# Patient Record
Sex: MALE | Race: WHITE
[De-identification: names, ages, dates, MRNs, and addresses within clinical notes are randomized per-mention and may not be internally consistent; named-entity substitution may affect disease eponyms.]

---

## 2020-03-16 ENCOUNTER — HOSPITAL ENCOUNTER (OUTPATIENT)
Dept: HOSPITAL 50 - VM.SDS | Age: 60
Discharge: HOME | End: 2020-03-16
Attending: SURGERY
Payer: COMMERCIAL

## 2020-03-16 DIAGNOSIS — Z88.1: ICD-10-CM

## 2020-03-16 DIAGNOSIS — Z79.899: ICD-10-CM

## 2020-03-16 DIAGNOSIS — Z09: Primary | ICD-10-CM

## 2020-03-16 DIAGNOSIS — M10.9: ICD-10-CM

## 2020-03-16 DIAGNOSIS — E66.9: ICD-10-CM

## 2020-03-16 DIAGNOSIS — Z79.51: ICD-10-CM

## 2020-03-16 DIAGNOSIS — Z87.19: ICD-10-CM

## 2020-03-16 DIAGNOSIS — Z79.84: ICD-10-CM

## 2020-03-16 DIAGNOSIS — N40.0: ICD-10-CM

## 2020-03-16 DIAGNOSIS — E11.9: ICD-10-CM

## 2020-03-16 DIAGNOSIS — Z88.8: ICD-10-CM

## 2020-03-16 DIAGNOSIS — Z88.2: ICD-10-CM

## 2020-03-16 DIAGNOSIS — Z79.82: ICD-10-CM

## 2020-03-16 DIAGNOSIS — Z87.442: ICD-10-CM

## 2020-03-16 NOTE — OR
PREOPERATIVE DIAGNOSIS:  History of diverticulitis.

 

POSTOPERATIVE DIAGNOSIS:  History of diverticulitis.

 

PROCEDURE PERFORMED:  Colonoscopy.

 

INDICATION:  The patient is a 59-year-old male with history of diverticulitis

about a month ago, seen on CT scan, presents for colonoscopy at this time.

 

PROCEDURE IN DETAIL:  This was done in the endoscopy suite.  Sedation was given

per Anesthesia.  First, a rectal exam was done and was normal.  Scope was

introduced into the rectum and slowly advanced to the rectum, sigmoid,

descending, transverse, and ascending colon until the cecum was reached.  Upon

reaching the cecum, scope was slowly withdrawn looking at all mucosal surfaces

on the way out.  No mucosal abnormalities, lesions, or polyps were noted.

 

FINAL DIAGNOSIS:  Normal colonoscopy.

 

 

BKD:  03/16/2020 09:15:04  MODL:  03/16/2020 10:47:39

Job #:  [id number]